# Patient Record
Sex: FEMALE | Race: WHITE | NOT HISPANIC OR LATINO
[De-identification: names, ages, dates, MRNs, and addresses within clinical notes are randomized per-mention and may not be internally consistent; named-entity substitution may affect disease eponyms.]

---

## 2020-09-04 PROBLEM — Z00.00 ENCOUNTER FOR PREVENTIVE HEALTH EXAMINATION: Status: ACTIVE | Noted: 2020-09-04

## 2020-09-08 ENCOUNTER — TRANSCRIPTION ENCOUNTER (OUTPATIENT)
Age: 55
End: 2020-09-08

## 2020-09-08 ENCOUNTER — APPOINTMENT (OUTPATIENT)
Dept: HEMATOLOGY ONCOLOGY | Facility: CLINIC | Age: 55
End: 2020-09-08
Payer: COMMERCIAL

## 2020-09-08 VITALS
HEIGHT: 63.5 IN | BODY MASS INDEX: 26.42 KG/M2 | DIASTOLIC BLOOD PRESSURE: 89 MMHG | WEIGHT: 151 LBS | HEART RATE: 96 BPM | RESPIRATION RATE: 16 BRPM | OXYGEN SATURATION: 98 % | TEMPERATURE: 98.5 F | SYSTOLIC BLOOD PRESSURE: 138 MMHG

## 2020-09-08 DIAGNOSIS — Z78.9 OTHER SPECIFIED HEALTH STATUS: ICD-10-CM

## 2020-09-08 DIAGNOSIS — Z11.59 ENCOUNTER FOR SCREENING FOR OTHER VIRAL DISEASES: ICD-10-CM

## 2020-09-08 DIAGNOSIS — O35.8XX0 MATERNAL CARE FOR OTHER (SUSPECTED) FETAL ABNORMALITY AND DAMAGE, NOT APPLICABLE OR UNSPECIFIED: ICD-10-CM

## 2020-09-08 PROCEDURE — 99204 OFFICE O/P NEW MOD 45 MIN: CPT | Mod: 25

## 2020-09-08 PROCEDURE — 36415 COLL VENOUS BLD VENIPUNCTURE: CPT

## 2020-09-11 LAB
25(OH)D3 SERPL-MCNC: 38.6 NG/ML
ALBUMIN SERPL ELPH-MCNC: 4.7 G/DL
ALP BLD-CCNC: 79 U/L
ALT SERPL-CCNC: 18 U/L
ANION GAP SERPL CALC-SCNC: 12 MMOL/L
APTT IMM NP/PRE NP PPP: NORMAL
APTT INV RATIO PPP: 29.1 SEC
AST SERPL-CCNC: 18 U/L
AT III PPP CHRO-ACNC: 129 %
B2 GLYCOPROT1 IGA SERPL IA-ACNC: <5 SAU
B2 GLYCOPROT1 IGG SER-ACNC: <5 SGU
B2 GLYCOPROT1 IGM SER-ACNC: <5 SMU
BASOPHILS # BLD AUTO: 0.06 K/UL
BASOPHILS NFR BLD AUTO: 1 %
BILIRUB SERPL-MCNC: 0.2 MG/DL
BUN SERPL-MCNC: 20 MG/DL
CALCIUM SERPL-MCNC: 9.6 MG/DL
CARDIOLIPIN AB SER IA-ACNC: NEGATIVE
CHLORIDE SERPL-SCNC: 105 MMOL/L
CO2 SERPL-SCNC: 24 MMOL/L
CONFIRM: 30 SEC
CREAT SERPL-MCNC: 0.82 MG/DL
DNA PLOIDY SPEC FC-IMP: NORMAL
DRVVT IMM 1:2 NP PPP: NORMAL
DRVVT SCREEN TO CONFIRM RATIO: 0.83 RATIO
EOSINOPHIL # BLD AUTO: 0.13 K/UL
EOSINOPHIL NFR BLD AUTO: 2.2 %
ERYTHROCYTE [SEDIMENTATION RATE] IN BLOOD BY WESTERGREN METHOD: 21 MM/HR
FACT VIII ACT/NOR PPP: 108 %
GLUCOSE SERPL-MCNC: 100 MG/DL
HCT VFR BLD CALC: 42.6 %
HCYS SERPL-MCNC: 11.6 UMOL/L
HGB BLD-MCNC: 13.4 G/DL
IMM GRANULOCYTES NFR BLD AUTO: 0.2 %
LDH SERPL-CCNC: 208 U/L
LYMPHOCYTES # BLD AUTO: 1.36 K/UL
LYMPHOCYTES NFR BLD AUTO: 22.9 %
MAN DIFF?: NORMAL
MCHC RBC-ENTMCNC: 29.6 PG
MCHC RBC-ENTMCNC: 31.5 GM/DL
MCV RBC AUTO: 94 FL
MONOCYTES # BLD AUTO: 0.33 K/UL
MONOCYTES NFR BLD AUTO: 5.6 %
NEUTROPHILS # BLD AUTO: 4.05 K/UL
NEUTROPHILS NFR BLD AUTO: 68.1 %
NPP NORMAL POOLED PLASMA: NORMAL SECS
PLATELET # BLD AUTO: 341 K/UL
POTASSIUM SERPL-SCNC: 4.2 MMOL/L
PROT C PPP CHRO-ACNC: 116 %
PROT S AG ACT/NOR PPP IA: 124 %
PROT SERPL-MCNC: 7.1 G/DL
RBC # BLD: 4.53 M/UL
RBC # FLD: 12.9 %
SARS-COV-2 IGG SERPL IA-ACNC: 0.09 INDEX
SARS-COV-2 IGG SERPL QL IA: NEGATIVE
SCREEN DRVVT: 27.6 SEC
SILICA CLOTTING TIME INTERPRETATION: NORMAL
SILICA CLOTTING TIME S/C: 0.88 RATIO
SODIUM SERPL-SCNC: 141 MMOL/L
TSH SERPL-ACNC: 2.59 UIU/ML
VIT B12 SERPL-MCNC: 441 PG/ML
WBC # FLD AUTO: 5.94 K/UL

## 2020-09-22 ENCOUNTER — TRANSCRIPTION ENCOUNTER (OUTPATIENT)
Age: 55
End: 2020-09-22

## 2020-09-22 DIAGNOSIS — E72.12 METHYLENETETRAHYDROFOLATE REDUCTASE DEFICIENCY: ICD-10-CM

## 2020-09-23 LAB — PTR INTERP: NORMAL

## 2020-09-23 NOTE — HISTORY OF PRESENT ILLNESS
[de-identified] : 54 years old white female past medical history of hypertension on 2 medications... She is here on the advice of her cardiologist since her mother had multiple DVTs and strokes starting at age 70... She herself denies history of DVT or PE... She had normal pregnancies x2 with 2 normal deliveries...

## 2020-09-23 NOTE — ASSESSMENT
[FreeTextEntry1] : Discussed with patient the pros and cons of doing a hypercoagulable work-up...\par \par If hypercoagulable work-up is positive we will not place patient on anticoagulation and she will be educated about clot prevention...\par \par If her hypercoagulable work-up is negative, given her family history patient is still at risk for clotting, and she again will have to practice caution as to prevent clotting i.e. keep her BMI normal, exercise regularly and keep well-hydrated...

## 2020-09-23 NOTE — CONSULT LETTER
[Dear  ___] : Dear  [unfilled], [Consult Letter:] : I had the pleasure of evaluating your patient, [unfilled]. [Please see my note below.] : Please see my note below. [Consult Closing:] : Thank you very much for allowing me to participate in the care of this patient.  If you have any questions, please do not hesitate to contact me. [Sincerely,] : Sincerely, [FreeTextEntry3] : Aby Doherty MD\par

## 2020-09-26 ENCOUNTER — TRANSCRIPTION ENCOUNTER (OUTPATIENT)
Age: 55
End: 2020-09-26

## 2020-11-24 ENCOUNTER — EMERGENCY (EMERGENCY)
Facility: HOSPITAL | Age: 55
LOS: 1 days | Discharge: HOME | End: 2020-11-24
Admitting: EMERGENCY MEDICINE
Payer: COMMERCIAL

## 2020-11-24 VITALS
SYSTOLIC BLOOD PRESSURE: 144 MMHG | OXYGEN SATURATION: 100 % | HEART RATE: 87 BPM | DIASTOLIC BLOOD PRESSURE: 90 MMHG | RESPIRATION RATE: 16 BRPM | TEMPERATURE: 98 F

## 2020-11-24 DIAGNOSIS — Z20.828 CONTACT WITH AND (SUSPECTED) EXPOSURE TO OTHER VIRAL COMMUNICABLE DISEASES: ICD-10-CM

## 2020-11-24 LAB — SARS-COV-2 RNA SPEC QL NAA+PROBE: SIGNIFICANT CHANGE UP

## 2020-11-24 PROCEDURE — 99283 EMERGENCY DEPT VISIT LOW MDM: CPT

## 2020-11-24 PROCEDURE — U0003: CPT

## 2020-11-24 NOTE — ED PROVIDER NOTE - PATIENT PORTAL LINK FT
You can access the FollowMyHealth Patient Portal offered by [facility] by registering at the following website: [email or web address]. By joining [facility]’s FollowMyHealth portal, you will also be able to view your health information using other applications (apps) compatible with our system.

## 2021-08-06 ENCOUNTER — RESULT REVIEW (OUTPATIENT)
Age: 56
End: 2021-08-06

## 2021-08-06 ENCOUNTER — APPOINTMENT (OUTPATIENT)
Dept: CT IMAGING | Facility: CLINIC | Age: 56
End: 2021-08-06
Payer: SELF-PAY

## 2021-08-06 ENCOUNTER — OUTPATIENT (OUTPATIENT)
Dept: OUTPATIENT SERVICES | Facility: HOSPITAL | Age: 56
LOS: 1 days | End: 2021-08-06

## 2021-08-06 PROCEDURE — 75571 CT HRT W/O DYE W/CA TEST: CPT | Mod: 26

## 2022-02-03 ENCOUNTER — NON-APPOINTMENT (OUTPATIENT)
Age: 57
End: 2022-02-03

## 2022-02-22 ENCOUNTER — APPOINTMENT (OUTPATIENT)
Dept: HEART AND VASCULAR | Facility: CLINIC | Age: 57
End: 2022-02-22

## 2022-03-17 ENCOUNTER — NON-APPOINTMENT (OUTPATIENT)
Age: 57
End: 2022-03-17

## 2022-04-05 ENCOUNTER — NON-APPOINTMENT (OUTPATIENT)
Age: 57
End: 2022-04-05

## 2022-04-05 ENCOUNTER — APPOINTMENT (OUTPATIENT)
Dept: HEART AND VASCULAR | Facility: CLINIC | Age: 57
End: 2022-04-05
Payer: MEDICAID

## 2022-04-05 VITALS
TEMPERATURE: 98.8 F | SYSTOLIC BLOOD PRESSURE: 143 MMHG | OXYGEN SATURATION: 98 % | HEIGHT: 63 IN | WEIGHT: 156 LBS | HEART RATE: 77 BPM | BODY MASS INDEX: 27.64 KG/M2 | DIASTOLIC BLOOD PRESSURE: 74 MMHG

## 2022-04-05 DIAGNOSIS — R07.9 CHEST PAIN, UNSPECIFIED: ICD-10-CM

## 2022-04-05 DIAGNOSIS — I10 ESSENTIAL (PRIMARY) HYPERTENSION: ICD-10-CM

## 2022-04-05 PROCEDURE — 99214 OFFICE O/P EST MOD 30 MIN: CPT | Mod: 25

## 2022-04-05 PROCEDURE — 93000 ELECTROCARDIOGRAM COMPLETE: CPT

## 2022-04-05 NOTE — DISCUSSION/SUMMARY
[FreeTextEntry1] : echo stress test\par obtain labs from PMD\par restart norvasc, c/w losartan\par follow up after testing

## 2022-04-05 NOTE — PHYSICAL EXAM
[Well Developed] : well developed [Well Nourished] : well nourished [Normal Venous Pressure] : normal venous pressure [Normal S1, S2] : normal S1, S2 [Clear Lung Fields] : clear lung fields [Soft] : abdomen soft [Normal Gait] : normal gait [No Edema] : no edema [No Cyanosis] : no cyanosis [No Rash] : no rash [Moves all extremities] : moves all extremities

## 2022-04-05 NOTE — HISTORY OF PRESENT ILLNESS
[FreeTextEntry1] : Chest pressure, mid sternal, worse with stress, heavy, 6/10, last minutes, 2-3 times over past 6 wks. \par \par She denies shortness of breath, palpitations, dizziness, syncope, PND, orthopnea, and lower extremity edema. Mild HA. She can walk a flight of stairs.  \par \par Mother passed away.  \par \par Ran out of norvasc, taking losartan\par \par Walking for exercise, gained weight.  Eating better\par \par \par \par \par

## 2022-04-05 NOTE — ASSESSMENT
[FreeTextEntry1] : 57 yo w with HTN, CAD p/w chest pain\par Prior EKG prolong Qtc- prob from meds, no family h/o SCD, no syncope in pt, EKG today normal QTc

## 2022-04-05 NOTE — CARDIOLOGY SUMMARY
[de-identified] : 2019: Negative stress echocardiogram.   [de-identified] : 6/5/15\par   ¦ 1. Left ventricular ejection fraction is normal.                          ¦\par   ¦ 2. Mild mitral insufficiency.                                             ¦\par   ¦ 3. Mild tricuspid insufficiency.                                          ¦\par   ¦ 4. Minimal pulmonic insufficiency.                                        ¦\par   ¦ 5. Too little tricuspid regurgitation from which to estimate the pulmonary artery systolic pressure.                                    ¦\par   ¦ 6. The aortic valve appears trileaflet.                                   ¦\par   ¦ 7. The left atrial pressure is approximately 13mmHg.                      ¦\par   ¦ 8. The aortic arch appears normal in diameter.  [de-identified] : CT calcium score 8/21: 9.53 (83%) [de-identified] : 2019: no plaque

## 2022-04-11 PROBLEM — Z11.59 SCREENING FOR VIRAL DISEASE: Status: ACTIVE | Noted: 2020-09-08

## 2022-04-25 ENCOUNTER — TRANSCRIPTION ENCOUNTER (OUTPATIENT)
Age: 57
End: 2022-04-25

## 2022-04-28 ENCOUNTER — APPOINTMENT (OUTPATIENT)
Dept: ORTHOPEDIC SURGERY | Facility: CLINIC | Age: 57
End: 2022-04-28
Payer: MEDICAID

## 2022-04-28 VITALS — HEIGHT: 63 IN | WEIGHT: 155 LBS | BODY MASS INDEX: 27.46 KG/M2

## 2022-04-28 DIAGNOSIS — Z82.61 FAMILY HISTORY OF ARTHRITIS: ICD-10-CM

## 2022-04-28 DIAGNOSIS — Z78.9 OTHER SPECIFIED HEALTH STATUS: ICD-10-CM

## 2022-04-28 DIAGNOSIS — S76.311A STRAIN OF MUSCLE, FASCIA AND TENDON OF THE POSTERIOR MUSCLE GROUP AT THIGH LEVEL, RIGHT THIGH, INITIAL ENCOUNTER: ICD-10-CM

## 2022-04-28 DIAGNOSIS — Z86.39 PERSONAL HISTORY OF OTHER ENDOCRINE, NUTRITIONAL AND METABOLIC DISEASE: ICD-10-CM

## 2022-04-28 DIAGNOSIS — Z86.79 PERSONAL HISTORY OF OTHER DISEASES OF THE CIRCULATORY SYSTEM: ICD-10-CM

## 2022-04-28 DIAGNOSIS — Z80.9 FAMILY HISTORY OF MALIGNANT NEOPLASM, UNSPECIFIED: ICD-10-CM

## 2022-04-28 DIAGNOSIS — Z87.39 PERSONAL HISTORY OF OTHER DISEASES OF THE MUSCULOSKELETAL SYSTEM AND CONNECTIVE TISSUE: ICD-10-CM

## 2022-04-28 PROCEDURE — 99203 OFFICE O/P NEW LOW 30 MIN: CPT

## 2022-04-28 PROCEDURE — 72170 X-RAY EXAM OF PELVIS: CPT

## 2022-04-28 RX ORDER — BUPROPION HYDROCHLORIDE 300 MG/1
300 TABLET, EXTENDED RELEASE ORAL
Refills: 0 | Status: ACTIVE | COMMUNITY

## 2022-04-28 RX ORDER — FLUOXETINE HYDROCHLORIDE 20 MG/1
20 CAPSULE ORAL
Refills: 0 | Status: ACTIVE | COMMUNITY

## 2022-04-28 NOTE — HISTORY OF PRESENT ILLNESS
[de-identified] : 57 y/o female presents today for initial visit for right leg pain. Patient states that on 4/22/22 she slipped and fell on mud and heard a pop on her thigh. She describes her pain as localized dull,achy, shooting and stabbing with rest and Aleve making it better and go up and down stairs making it worse.

## 2022-04-28 NOTE — REVIEW OF SYSTEMS
[Joint Pain] : joint pain [Joint Stiffness] : joint stiffness [Joint Swelling] : joint swelling [Recent Weight Gain (___ Lbs)] : recent ~M [unfilled] lbs weight gain

## 2022-04-28 NOTE — PHYSICAL EXAM
[de-identified] : Right hip exam . No pain on passive IR ER flexion of hip. Tender at greater trochanter and symphysis pubis plus pain on hip extension and hamstring stress testing. No defect in buttock or hamstring palpable and no ecchymosis. \par  [de-identified] : AP pelvis shows no abnormality at pubic ramis or avulsion fx. \par

## 2022-04-28 NOTE — DISCUSSION/SUMMARY
[de-identified] : Grade 1-2 hamstring strain with secondary inflammation at symphysis and trochanter . \par Plan Meloxicam '\par Physical therapy\par Natural history of hamstring strain discussed today with patient. \par F/U 4 weeks

## 2022-05-02 RX ORDER — MELOXICAM 15 MG/1
15 TABLET ORAL DAILY
Qty: 30 | Refills: 1 | Status: COMPLETED | COMMUNITY
Start: 2022-05-02 | End: 2022-07-01

## 2022-05-17 ENCOUNTER — OUTPATIENT (OUTPATIENT)
Dept: OUTPATIENT SERVICES | Facility: HOSPITAL | Age: 57
LOS: 1 days | End: 2022-05-17
Payer: COMMERCIAL

## 2022-05-17 DIAGNOSIS — R07.9 CHEST PAIN, UNSPECIFIED: ICD-10-CM

## 2022-05-17 PROCEDURE — 93306 TTE W/DOPPLER COMPLETE: CPT

## 2022-05-17 PROCEDURE — 93306 TTE W/DOPPLER COMPLETE: CPT | Mod: 26

## 2022-05-26 ENCOUNTER — APPOINTMENT (OUTPATIENT)
Dept: ORTHOPEDIC SURGERY | Facility: CLINIC | Age: 57
End: 2022-05-26
Payer: MEDICAID

## 2022-05-26 DIAGNOSIS — M70.61 TROCHANTERIC BURSITIS, RIGHT HIP: ICD-10-CM

## 2022-05-26 PROCEDURE — 20610 DRAIN/INJ JOINT/BURSA W/O US: CPT | Mod: RT

## 2022-05-26 PROCEDURE — 99214 OFFICE O/P EST MOD 30 MIN: CPT | Mod: 25

## 2022-05-26 NOTE — HISTORY OF PRESENT ILLNESS
[de-identified] : 55 y/o female presents today for follow up for right leg pain. Patient states she's attending her physical therapy sessions and is doing much better.\par SHe has little to no hamstring pain now but is experiencing discomfort at the greater trochanteric bursa and inner groin. \par She has been in PT 4 weeks.

## 2022-05-26 NOTE — PHYSICAL EXAM
[de-identified] : No tenderness at the symphisis pubis. Tender at greater trochanteric bursa. \par HIp Normal IR ER no pain on straight leg raise.\par Tender near origin of posterior hamstring. Good resisted strength testing of hamstring and gluteus medius. \par

## 2022-05-26 NOTE — PROCEDURE
[de-identified] : The right greater troch region was prepped with alchohol and ethyl chloride was used to numb the skin. A 6cc injection with 3 cc xylocaine, 2cc sensoricaine and 1 cc depomedrol , was given without complication into the trochanteric bursa.. Patient instructed that there may be an inflammatory flare for 24 hrs , to use ice or advil if needed\par \par

## 2022-05-26 NOTE — DISCUSSION/SUMMARY
[de-identified] : 70-80 percent improvement from Hamstring and pelvic strain. \par WIll continue PT d/c meloxicam due to gastric distress. \par Injection greater trochanteric bursa. \par

## 2022-06-23 ENCOUNTER — OUTPATIENT (OUTPATIENT)
Dept: OUTPATIENT SERVICES | Facility: HOSPITAL | Age: 57
LOS: 1 days | End: 2022-06-23
Payer: COMMERCIAL

## 2022-06-23 DIAGNOSIS — I10 ESSENTIAL (PRIMARY) HYPERTENSION: ICD-10-CM

## 2022-06-23 DIAGNOSIS — R07.9 CHEST PAIN, UNSPECIFIED: ICD-10-CM

## 2022-06-23 PROCEDURE — 93351 STRESS TTE COMPLETE: CPT | Mod: 26

## 2022-06-23 PROCEDURE — 93351 STRESS TTE COMPLETE: CPT

## 2022-06-30 ENCOUNTER — APPOINTMENT (OUTPATIENT)
Dept: ORTHOPEDIC SURGERY | Facility: CLINIC | Age: 57
End: 2022-06-30

## 2022-06-30 DIAGNOSIS — M25.551 PAIN IN RIGHT HIP: ICD-10-CM

## 2022-06-30 DIAGNOSIS — M54.16 RADICULOPATHY, LUMBAR REGION: ICD-10-CM

## 2022-06-30 PROCEDURE — 99213 OFFICE O/P EST LOW 20 MIN: CPT

## 2022-06-30 PROCEDURE — 72100 X-RAY EXAM L-S SPINE 2/3 VWS: CPT

## 2022-06-30 NOTE — DISCUSSION/SUMMARY
[de-identified] : 55 y/o female presents today for follow up for right posterio buttock pain and groin pain. \par Differential Dx is lumbar radiculopathy vs resolving hamstring strain. \par Plan \par Medrol dose corazon and adjust PT to include osteopathic evaluation and Rx \par In 3 weeks if pain perissts consider MRI hip/hamstring plus Lumbar MRI. \par

## 2022-06-30 NOTE — PHYSICAL EXAM
[de-identified] : Right hip: No Tenderness at greater trochanter bursa, Normal IR/ER, no pain with straight let raise, Quad strength 5/5. Tender near origin of posterior hamstring. Hamstring and strength 4/5 gluteus medius  [de-identified] : Lumbar spine x ray  2 views shows no acute fracture and normal curvature. Narrowing of L1-L2 disc space from DDD. L45 and L5 S1 wnl

## 2022-06-30 NOTE — HISTORY OF PRESENT ILLNESS
[de-identified] : 57 y/o female presents today for follow up for right leg pain. Patient states she attending her physical therapy session and is doing much better. She stated if she sit for long periods of time she feels pain and discomfort. She takes Aleve occasionally. \par Her persistent symptom is both in the right groin as well as the sciatic distribution. \par

## 2022-07-21 ENCOUNTER — APPOINTMENT (OUTPATIENT)
Dept: ORTHOPEDIC SURGERY | Facility: CLINIC | Age: 57
End: 2022-07-21

## 2022-07-21 PROCEDURE — 99213 OFFICE O/P EST LOW 20 MIN: CPT

## 2022-08-29 ENCOUNTER — APPOINTMENT (OUTPATIENT)
Dept: ORTHOPEDIC SURGERY | Facility: CLINIC | Age: 57
End: 2022-08-29

## 2022-12-06 ENCOUNTER — NON-APPOINTMENT (OUTPATIENT)
Age: 57
End: 2022-12-06

## 2022-12-07 ENCOUNTER — APPOINTMENT (OUTPATIENT)
Dept: ORTHOPEDIC SURGERY | Facility: CLINIC | Age: 57
End: 2022-12-07

## 2022-12-07 DIAGNOSIS — M75.41 IMPINGEMENT SYNDROME OF RIGHT SHOULDER: ICD-10-CM

## 2022-12-07 PROCEDURE — 20610 DRAIN/INJ JOINT/BURSA W/O US: CPT

## 2022-12-07 PROCEDURE — 73030 X-RAY EXAM OF SHOULDER: CPT | Mod: RT

## 2022-12-07 PROCEDURE — 99214 OFFICE O/P EST MOD 30 MIN: CPT | Mod: 25

## 2022-12-07 PROCEDURE — 72170 X-RAY EXAM OF PELVIS: CPT

## 2022-12-07 NOTE — PHYSICAL EXAM
[de-identified] : Negative straight leg raise this time. \par Good ability to resist hamstring and gluteal manual resistance. Still grade 4+ hamstring on the effected side.

## 2022-12-07 NOTE — DISCUSSION/SUMMARY
[de-identified] : Mallory Mahajan returns for the first time since August. \par She had fully resolved her posterior hamstring diffuse pain after PT and meds in the summer but recently it has returned and is more focal. \par It is directly over the posteromedial hamstring just distal to the pelvic insertion in the muscle belly. It is tender on palpation and hurts with ambulation. Interestingly her adductor is fully non tender and has nor resistance weakness in isometric testing and the hamstring although uncomfortable still has good isometric resistance strength. \par I am not sure of the etiology here and believe now after 6 months of on and off pain MRI of the hamstring in pelvic region and upper third is needed. \par On an unrelated note she also comes in with a new problem which is classic SS weakness and secondary impingement suggesting a partial SS tear.\par Plan \par MRI of  right hamstring and pelvis\par PT for deep tissue mobs and eccentric PRE as able\par cortisone injection right shoulder and home PRE program \par F/U 4 weeks

## 2022-12-07 NOTE — PHYSICAL EXAM
[de-identified] : Right hip full ROM NO tenderness in anterio hip or groin or at the greater tuberosity. \par Adduction isometric testing normal some discomfort on hamstring isometric testing. No tenderenss at pubis or adductor tendon. \par Tightness on SLR\par Right shoudler positive Neer Huerta and apprehension sign. Neg speed's and Aaron's \par SS 4/5 IS and subscap 5/5 [de-identified] :  4 views of right shoulder wnl no head elevation or major bone spurs. \par AP pelvis wnl except for question of small traction abnormality at inferior right pubic ramis. \par

## 2022-12-07 NOTE — HISTORY OF PRESENT ILLNESS
[de-identified] : 57 y/o female presents today for follow up for right leg pain. Patient states she's attending her physical therapy sessions, but is still experiencing some achiness.\par Her radicular symptoms have fully dissipated. \par \par

## 2022-12-07 NOTE — DISCUSSION/SUMMARY
[de-identified] : Resolving Hamstring grade 1-2 strain with increasing strength and decreasing pain. \par No further radiculopathy form sciatica after medrol corazon. \par Plan \par continue PT \par No MRI needed. \par F/U 5 weeks

## 2022-12-07 NOTE — HISTORY OF PRESENT ILLNESS
[de-identified] : 57 year old female follow up for right leg pain. Pt states physical therapy was great but her finished in September 2022. She was fully pain free until 3 weeks ago in November. AT that time she started again to have a very specific pain in the right posteromedial hamstring at one point aggravated by sitting and walking. SHe describes the pain as a achy pain which is very uncomfortable especially if she sit for long.\par On an unrelated note she has had 10 days of clicking and discomfort in upper marisol shoulder with overhead and abduction motions, no specific trauma. \par

## 2023-01-05 ENCOUNTER — APPOINTMENT (OUTPATIENT)
Dept: ORTHOPEDIC SURGERY | Facility: CLINIC | Age: 58
End: 2023-01-05
Payer: MEDICAID

## 2023-01-05 PROCEDURE — 99214 OFFICE O/P EST MOD 30 MIN: CPT

## 2023-01-05 NOTE — PHYSICAL EXAM
[de-identified] : MRI review of hamstring and pelvis shows bilateral hamstring tears with .8 on right and .5 on left cm displacement. \par No other abnormality

## 2023-01-05 NOTE — HISTORY OF PRESENT ILLNESS
[de-identified] : 57 year old female followup with right leg pain. Pt here for a MRI review of right femur. Pt states she is still experiencing some discomfort but have started physical therapy and as seen some progress.

## 2023-02-02 ENCOUNTER — APPOINTMENT (OUTPATIENT)
Dept: ORTHOPEDIC SURGERY | Facility: CLINIC | Age: 58
End: 2023-02-02
Payer: MEDICAID

## 2023-02-02 DIAGNOSIS — S76.311D STRAIN OF MUSCLE, FASCIA AND TENDON OF THE POSTERIOR MUSCLE GROUP AT THIGH LEVEL, RIGHT THIGH, SUBSEQUENT ENCOUNTER: ICD-10-CM

## 2023-02-02 PROCEDURE — 99213 OFFICE O/P EST LOW 20 MIN: CPT

## 2023-02-02 RX ORDER — MELOXICAM 15 MG/1
15 TABLET ORAL DAILY
Qty: 30 | Refills: 1 | Status: ACTIVE | COMMUNITY
Start: 2023-02-02 | End: 1900-01-01

## 2023-02-02 NOTE — DISCUSSION/SUMMARY
[de-identified] : I think at this time rehab had been helpful but she still gets a radiating deep ache in the muscle belly which could be neurogenic or could be cramping . \par I have spoken to Brayan Dee at [facility] he will image the tender area but also if no target there consider xylocaine and cortisone injection at the origin and see if that gives temporary benefit. If so and it returns will go for prp injeciton there. \par

## 2023-02-02 NOTE — HISTORY OF PRESENT ILLNESS
[de-identified] : Mallory is now 9 months s/p bilateral hamstring strains with slight tear displacement. Left hamstring now asymptomatic and right side still gets pain radiating to the inner thigh hamstring a hands breadth distal to the origin. \par

## 2023-03-27 RX ORDER — MELOXICAM 15 MG/1
15 TABLET ORAL DAILY
Qty: 30 | Refills: 1 | Status: ACTIVE | COMMUNITY
Start: 2023-03-27 | End: 1900-01-01

## 2024-03-07 ENCOUNTER — NON-APPOINTMENT (OUTPATIENT)
Age: 59
End: 2024-03-07

## 2024-03-07 ENCOUNTER — APPOINTMENT (OUTPATIENT)
Dept: HEART AND VASCULAR | Facility: CLINIC | Age: 59
End: 2024-03-07
Payer: COMMERCIAL

## 2024-03-07 VITALS
OXYGEN SATURATION: 99 % | DIASTOLIC BLOOD PRESSURE: 88 MMHG | HEIGHT: 63 IN | HEART RATE: 69 BPM | SYSTOLIC BLOOD PRESSURE: 153 MMHG | WEIGHT: 153 LBS | BODY MASS INDEX: 27.11 KG/M2

## 2024-03-07 PROCEDURE — 99213 OFFICE O/P EST LOW 20 MIN: CPT

## 2024-03-07 RX ORDER — VALSARTAN 320 MG/1
320 TABLET, COATED ORAL DAILY
Qty: 90 | Refills: 3 | Status: ACTIVE | COMMUNITY
Start: 2024-03-07 | End: 1900-01-01

## 2024-03-07 RX ORDER — LOSARTAN POTASSIUM 100 MG/1
100 TABLET, FILM COATED ORAL DAILY
Qty: 90 | Refills: 3 | Status: DISCONTINUED | COMMUNITY
End: 2024-03-07

## 2024-03-07 RX ORDER — AMLODIPINE BESYLATE 5 MG/1
5 TABLET ORAL DAILY
Qty: 90 | Refills: 3 | Status: ACTIVE | COMMUNITY

## 2024-03-07 RX ORDER — MIRABEGRON 25 MG/1
25 TABLET, FILM COATED, EXTENDED RELEASE ORAL
Refills: 0 | Status: ACTIVE | COMMUNITY

## 2024-03-07 NOTE — PHYSICAL EXAM
[Well Developed] : well developed [Well Nourished] : well nourished [No Acute Distress] : no acute distress [Normal S1, S2] : normal S1, S2

## 2024-03-07 NOTE — DISCUSSION/SUMMARY
[FreeTextEntry1] : change losartan to valsartan c/w norvasc labs after change: lipids, cmp discussed walking f/u 6mo

## 2024-03-07 NOTE — ASSESSMENT
[FreeTextEntry1] : 56yo w with HTN, ca score 10 (83% ) Prior EKG prolong Qtc- prob from meds, no family h/o SCD, no syncope in pt, EKG today normal QTc BP not at goal

## 2024-03-07 NOTE — HISTORY OF PRESENT ILLNESS
[FreeTextEntry1] : flu last month, still fatigued  no cardiac complaints  She denies shortness of breath, palpitations, dizziness, syncope, PND, orthopnea, and lower extremity edema. She can walk a flight of stairs.  walking dog for exercise, not walking as much d/t full time job.  2021:  The calcium score is mild at 9.53 Agatston units, which is at the 83 percentile, adjusted for age, gender and race.

## 2024-03-25 DIAGNOSIS — E78.00 PURE HYPERCHOLESTEROLEMIA, UNSPECIFIED: ICD-10-CM

## 2024-03-25 LAB
ALBUMIN SERPL ELPH-MCNC: 4.9 G/DL
ALP BLD-CCNC: 75 U/L
ALT SERPL-CCNC: 23 U/L
ANION GAP SERPL CALC-SCNC: 16 MMOL/L
AST SERPL-CCNC: 17 U/L
BILIRUB SERPL-MCNC: 0.3 MG/DL
BUN SERPL-MCNC: 23 MG/DL
CALCIUM SERPL-MCNC: 10.4 MG/DL
CHLORIDE SERPL-SCNC: 101 MMOL/L
CHOLEST SERPL-MCNC: 234 MG/DL
CO2 SERPL-SCNC: 24 MMOL/L
CREAT SERPL-MCNC: 0.93 MG/DL
EGFR: 72 ML/MIN/1.73M2
GLUCOSE SERPL-MCNC: 88 MG/DL
HDLC SERPL-MCNC: 86 MG/DL
LDLC SERPL CALC-MCNC: 133 MG/DL
NONHDLC SERPL-MCNC: 148 MG/DL
POTASSIUM SERPL-SCNC: 5.2 MMOL/L
PROT SERPL-MCNC: 7.2 G/DL
SODIUM SERPL-SCNC: 140 MMOL/L
TRIGL SERPL-MCNC: 89 MG/DL

## 2024-03-25 RX ORDER — ROSUVASTATIN CALCIUM 10 MG/1
10 TABLET, FILM COATED ORAL
Qty: 90 | Refills: 3 | Status: ACTIVE | COMMUNITY
Start: 2024-03-25 | End: 1900-01-01

## 2024-12-19 ENCOUNTER — NON-APPOINTMENT (OUTPATIENT)
Age: 59
End: 2024-12-19

## 2024-12-19 ENCOUNTER — APPOINTMENT (OUTPATIENT)
Dept: HEART AND VASCULAR | Facility: CLINIC | Age: 59
End: 2024-12-19
Payer: COMMERCIAL

## 2024-12-19 VITALS
HEIGHT: 63 IN | HEART RATE: 70 BPM | SYSTOLIC BLOOD PRESSURE: 163 MMHG | DIASTOLIC BLOOD PRESSURE: 91 MMHG | BODY MASS INDEX: 28.17 KG/M2 | WEIGHT: 159 LBS | OXYGEN SATURATION: 98 %

## 2024-12-19 PROCEDURE — G2211 COMPLEX E/M VISIT ADD ON: CPT

## 2024-12-19 PROCEDURE — 99214 OFFICE O/P EST MOD 30 MIN: CPT

## 2024-12-19 RX ORDER — MULTIVIT,IRON,MINERALS/LUTEIN
TABLET ORAL
Refills: 0 | Status: ACTIVE | COMMUNITY

## 2024-12-19 RX ORDER — MAGNESIUM OXIDE/MAG AA CHELATE 300 MG
CAPSULE ORAL
Refills: 0 | Status: ACTIVE | COMMUNITY

## 2024-12-19 RX ORDER — VALSARTAN AND HYDROCHLOROTHIAZIDE 320; 25 MG/1; MG/1
320-25 TABLET, FILM COATED ORAL DAILY
Qty: 90 | Refills: 3 | Status: ACTIVE | COMMUNITY
Start: 2024-12-19 | End: 1900-01-01

## 2024-12-19 RX ORDER — MULTIVIT-MIN/IRON/FOLIC ACID/K 18-600-40
CAPSULE ORAL
Refills: 0 | Status: ACTIVE | COMMUNITY